# Patient Record
Sex: FEMALE | Employment: FULL TIME | URBAN - METROPOLITAN AREA
[De-identification: names, ages, dates, MRNs, and addresses within clinical notes are randomized per-mention and may not be internally consistent; named-entity substitution may affect disease eponyms.]

---

## 2021-08-05 ENCOUNTER — OFFICE VISIT (OUTPATIENT)
Dept: OBGYN CLINIC | Facility: CLINIC | Age: 36
End: 2021-08-05
Payer: COMMERCIAL

## 2021-08-05 VITALS
WEIGHT: 178.19 LBS | SYSTOLIC BLOOD PRESSURE: 112 MMHG | BODY MASS INDEX: 27.01 KG/M2 | HEIGHT: 68 IN | DIASTOLIC BLOOD PRESSURE: 66 MMHG

## 2021-08-05 DIAGNOSIS — R10.2 PELVIC PAIN: Primary | ICD-10-CM

## 2021-08-05 DIAGNOSIS — N94.9 ADNEXAL FULLNESS: ICD-10-CM

## 2021-08-05 PROCEDURE — 99203 OFFICE O/P NEW LOW 30 MIN: CPT | Performed by: OBSTETRICS & GYNECOLOGY

## 2021-08-05 PROCEDURE — 3078F DIAST BP <80 MM HG: CPT | Performed by: OBSTETRICS & GYNECOLOGY

## 2021-08-05 PROCEDURE — 3008F BODY MASS INDEX DOCD: CPT | Performed by: OBSTETRICS & GYNECOLOGY

## 2021-08-05 PROCEDURE — 3074F SYST BP LT 130 MM HG: CPT | Performed by: OBSTETRICS & GYNECOLOGY

## 2021-08-05 RX ORDER — CITALOPRAM 40 MG/1
40 TABLET ORAL EVERY EVENING
COMMUNITY
Start: 2021-07-11

## 2021-08-05 RX ORDER — BUPROPION HYDROCHLORIDE 150 MG/1
150 TABLET ORAL EVERY MORNING
COMMUNITY
Start: 2021-07-11

## 2021-08-05 NOTE — PROGRESS NOTES
OB/GYN H&P       CC: Patient presents with:  Abdominal Pain: menstrual cramps even with no period on/off--painful intercourse x1 month       HPI: Carol Senior is a 28year old  here for consultation regarding new onset pelvic pain.   Has been happening evening., Disp: , Rfl:   buPROPion 150 MG Oral Tablet 24 Hr, Take 150 mg by mouth every morning., Disp: , Rfl:     No current facility-administered medications on file prior to visit. No family history on file.   Social History    Tobacco Use      Smokin

## 2021-08-18 ENCOUNTER — ULTRASOUND ENCOUNTER (OUTPATIENT)
Dept: OBGYN CLINIC | Facility: CLINIC | Age: 36
End: 2021-08-18
Payer: COMMERCIAL

## 2021-08-20 DIAGNOSIS — R10.2 PELVIC PAIN: Primary | ICD-10-CM

## 2021-08-20 PROCEDURE — 76830 TRANSVAGINAL US NON-OB: CPT | Performed by: OBSTETRICS & GYNECOLOGY

## 2021-08-20 PROCEDURE — 76856 US EXAM PELVIC COMPLETE: CPT | Performed by: OBSTETRICS & GYNECOLOGY

## 2024-11-15 ENCOUNTER — TELEPHONE (OUTPATIENT)
Dept: PAIN CLINIC | Facility: CLINIC | Age: 39
End: 2024-11-15

## 2024-11-15 NOTE — TELEPHONE ENCOUNTER
Received OK from MD to schedule as a new patient.  Tried to contact patient to schedule but no VM available.  Please assist in scheduling if patient calls back.   Notes at .

## 2024-11-15 NOTE — TELEPHONE ENCOUNTER
Received incoming fax from [facility] for pt to be seen by MIGUEL PAIN for chronic bilateral low back pain.     Referral and records have been placed in RN bin for MD review.